# Patient Record
Sex: MALE | Race: WHITE | NOT HISPANIC OR LATINO | URBAN - METROPOLITAN AREA
[De-identification: names, ages, dates, MRNs, and addresses within clinical notes are randomized per-mention and may not be internally consistent; named-entity substitution may affect disease eponyms.]

---

## 2018-05-16 ENCOUNTER — IMPORTED ENCOUNTER (OUTPATIENT)
Dept: URBAN - METROPOLITAN AREA CLINIC 50 | Facility: CLINIC | Age: 78
End: 2018-05-16

## 2018-05-23 ENCOUNTER — IMPORTED ENCOUNTER (OUTPATIENT)
Dept: URBAN - METROPOLITAN AREA CLINIC 50 | Facility: CLINIC | Age: 78
End: 2018-05-23

## 2019-06-25 ENCOUNTER — IMPORTED ENCOUNTER (OUTPATIENT)
Dept: URBAN - METROPOLITAN AREA CLINIC 50 | Facility: CLINIC | Age: 79
End: 2019-06-25

## 2020-07-07 ENCOUNTER — IMPORTED ENCOUNTER (OUTPATIENT)
Dept: URBAN - METROPOLITAN AREA CLINIC 50 | Facility: CLINIC | Age: 80
End: 2020-07-07

## 2021-06-14 ASSESSMENT — VISUAL ACUITY
OD_CC: J1+@ 16 IN
OS_BAT: 20/20
OS_CC: J1+@ 16 IN
OS_OTHER: 20/20. 20/20.
OD_CC: 20/20
OS_CC: J1+@ 18 IN
OS_CC: 20/20
OD_CC: J1+
OS_CC: 20/20
OD_CC: 20/25-1
OS_CC: 20/20
OD_CC: J1+@ 18 IN
OS_CC: J1+
OD_CC: 20/20

## 2021-06-14 ASSESSMENT — TONOMETRY
OD_IOP_MMHG: 12
OS_IOP_MMHG: 12
OD_IOP_MMHG: 15
OS_IOP_MMHG: 16
OS_IOP_MMHG: 13
OD_IOP_MMHG: 13

## 2021-06-30 ENCOUNTER — PREPPED CHART (OUTPATIENT)
Dept: URBAN - METROPOLITAN AREA CLINIC 53 | Facility: CLINIC | Age: 81
End: 2021-06-30

## 2021-07-07 ENCOUNTER — COMPREHENSIVE EXAM (OUTPATIENT)
Dept: URBAN - METROPOLITAN AREA CLINIC 53 | Facility: CLINIC | Age: 81
End: 2021-07-07

## 2021-07-07 DIAGNOSIS — H43.813: ICD-10-CM

## 2021-07-07 DIAGNOSIS — H16.223: ICD-10-CM

## 2021-07-07 DIAGNOSIS — H10.13: ICD-10-CM

## 2021-07-07 PROCEDURE — 92014 COMPRE OPH EXAM EST PT 1/>: CPT

## 2021-07-07 RX ORDER — GLYCERIN 2 G/1000ML: 1 SOLUTION/ DROPS OPHTHALMIC

## 2021-07-07 ASSESSMENT — VISUAL ACUITY
OD_SC: 20/25
OU_CC: 20/20
OU_SC: J2 @ 14IN
OS_CC: 20/20
OD_CC: 20/20
OS_SC: 20/25
OU_CC: J1+ @ 14IN

## 2021-07-07 ASSESSMENT — TONOMETRY
OD_IOP_MMHG: 14
OS_IOP_MMHG: 16

## 2021-07-07 NOTE — PATIENT DISCUSSION
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 Start Loteprednol BID OU x 3 weeks then re-start Pataday BID OU. OK to pulse dose Loteprednol as needed.

## 2022-06-29 ENCOUNTER — COMPREHENSIVE EXAM (OUTPATIENT)
Dept: URBAN - METROPOLITAN AREA CLINIC 49 | Facility: CLINIC | Age: 82
End: 2022-06-29

## 2022-06-29 DIAGNOSIS — H10.13: ICD-10-CM

## 2022-06-29 DIAGNOSIS — H18.413: ICD-10-CM

## 2022-06-29 DIAGNOSIS — H35.89: ICD-10-CM

## 2022-06-29 DIAGNOSIS — H43.813: ICD-10-CM

## 2022-06-29 PROCEDURE — 92014 COMPRE OPH EXAM EST PT 1/>: CPT

## 2022-06-29 RX ORDER — ALCAFTADINE 2.5 MG/ML
1 SOLUTION/ DROPS OPHTHALMIC ONCE A DAY
Start: 2022-06-29

## 2022-06-29 ASSESSMENT — VISUAL ACUITY
OS_CC: 20/20
OD_CC: 20/20
OU_CC: J1+

## 2022-06-29 ASSESSMENT — TONOMETRY
OD_IOP_MMHG: 14
OS_IOP_MMHG: 15

## 2022-06-29 NOTE — PATIENT DISCUSSION
Advised patient to restart Pataday 0.7% Extra Strength OU QAM in combination with Lastacaft OU Qday (afternoon). In between use PF ATs OU 2-3x a day.

## 2023-01-19 NOTE — PATIENT DISCUSSION
pt declined refraction. Dr. Suki Flores performed for his interest in best corrected vision, paritucularly OD w the [facility].

## 2023-01-19 NOTE — PATIENT DISCUSSION
photos taken today as concerned about any change and not possible to see undilated as pt declined dilation.

## 2023-11-21 ENCOUNTER — COMPREHENSIVE EXAM (OUTPATIENT)
Dept: URBAN - METROPOLITAN AREA CLINIC 49 | Facility: LOCATION | Age: 83
End: 2023-11-21

## 2023-11-21 DIAGNOSIS — H10.13: ICD-10-CM

## 2023-11-21 DIAGNOSIS — H16.223: ICD-10-CM

## 2023-11-21 DIAGNOSIS — H43.813: ICD-10-CM

## 2023-11-21 DIAGNOSIS — H35.89: ICD-10-CM

## 2023-11-21 PROCEDURE — 92015 DETERMINE REFRACTIVE STATE: CPT

## 2023-11-21 PROCEDURE — 92014 COMPRE OPH EXAM EST PT 1/>: CPT

## 2023-11-21 ASSESSMENT — VISUAL ACUITY
OU_CC: J1+ @ 16"
OD_CC: 20/20
OS_CC: 20/20-2

## 2023-11-21 ASSESSMENT — TONOMETRY
OS_IOP_MMHG: 16
OD_IOP_MMHG: 15

## 2025-05-27 ENCOUNTER — COMPREHENSIVE EXAM (OUTPATIENT)
Age: 85
End: 2025-05-27

## 2025-05-27 DIAGNOSIS — H43.813: ICD-10-CM

## 2025-05-27 DIAGNOSIS — H16.223: ICD-10-CM

## 2025-05-27 DIAGNOSIS — H35.89: ICD-10-CM

## 2025-05-27 DIAGNOSIS — H04.123: ICD-10-CM

## 2025-05-27 PROCEDURE — 92134 CPTRZ OPH DX IMG PST SGM RTA: CPT

## 2025-05-27 PROCEDURE — 99214 OFFICE O/P EST MOD 30 MIN: CPT
